# Patient Record
Sex: MALE | Race: WHITE | NOT HISPANIC OR LATINO | Employment: FULL TIME | URBAN - NONMETROPOLITAN AREA
[De-identification: names, ages, dates, MRNs, and addresses within clinical notes are randomized per-mention and may not be internally consistent; named-entity substitution may affect disease eponyms.]

---

## 2017-06-06 ENCOUNTER — TELEPHONE (OUTPATIENT)
Dept: URGENT CARE | Facility: CLINIC | Age: 62
End: 2017-06-06

## 2017-06-06 NOTE — TELEPHONE ENCOUNTER
----- Message from SHAE Benavidez sent at 6/6/2017  7:53 AM EDT -----  Please fax culture results to oral surgeon please thank you.

## 2018-04-20 ENCOUNTER — TRANSCRIBE ORDERS (OUTPATIENT)
Dept: ADMINISTRATIVE | Facility: HOSPITAL | Age: 63
End: 2018-04-20

## 2018-04-20 DIAGNOSIS — R22.1 NECK MASS: Primary | ICD-10-CM

## 2018-04-23 ENCOUNTER — HOSPITAL ENCOUNTER (OUTPATIENT)
Dept: CT IMAGING | Facility: HOSPITAL | Age: 63
Discharge: HOME | End: 2018-04-23
Admitting: ORAL & MAXILLOFACIAL SURGERY

## 2018-04-23 DIAGNOSIS — R22.1 NECK MASS: ICD-10-CM

## 2018-04-23 LAB
CREAT BLD-MCNC: 1 MG/DL (ref 0.6–1.3)
GFR SERPL CREATININE-BSD FRML MDRD: 75 ML/MIN/1.73

## 2018-04-23 PROCEDURE — 82565 ASSAY OF CREATININE: CPT | Performed by: RADIOLOGY

## 2018-04-23 PROCEDURE — 70491 CT SOFT TISSUE NECK W/DYE: CPT

## 2018-04-23 PROCEDURE — 25010000002 IOPAMIDOL 61 % SOLUTION: Performed by: ORAL & MAXILLOFACIAL SURGERY

## 2018-04-23 RX ADMIN — IOPAMIDOL 90 ML: 612 INJECTION, SOLUTION INTRAVENOUS at 17:30

## 2018-05-07 ENCOUNTER — TRANSCRIBE ORDERS (OUTPATIENT)
Dept: ADMINISTRATIVE | Facility: HOSPITAL | Age: 63
End: 2018-05-07

## 2018-05-07 DIAGNOSIS — R22.1 NECK MASS: Primary | ICD-10-CM

## 2018-05-11 ENCOUNTER — HOSPITAL ENCOUNTER (OUTPATIENT)
Dept: PET IMAGING | Facility: HOSPITAL | Age: 63
Discharge: HOME | End: 2018-05-11

## 2018-05-11 ENCOUNTER — HOSPITAL ENCOUNTER (OUTPATIENT)
Dept: PET IMAGING | Facility: HOSPITAL | Age: 63
Discharge: HOME | End: 2018-05-11
Admitting: ORAL & MAXILLOFACIAL SURGERY

## 2018-05-11 ENCOUNTER — HOSPITAL ENCOUNTER (OUTPATIENT)
Dept: CT IMAGING | Facility: HOSPITAL | Age: 63
Discharge: HOME | End: 2018-05-11

## 2018-05-11 DIAGNOSIS — R22.1 NECK MASS: ICD-10-CM

## 2018-05-11 LAB
CREAT BLDA-MCNC: 1 MG/DL (ref 0.6–1.3)
GLUCOSE BLDC GLUCOMTR-MCNC: 102 MG/DL (ref 70–130)

## 2018-05-11 PROCEDURE — A9552 F18 FDG: HCPCS | Performed by: ORAL & MAXILLOFACIAL SURGERY

## 2018-05-11 PROCEDURE — 25010000002 IOPAMIDOL 61 % SOLUTION: Performed by: ORAL & MAXILLOFACIAL SURGERY

## 2018-05-11 PROCEDURE — 82565 ASSAY OF CREATININE: CPT

## 2018-05-11 PROCEDURE — 78815 PET IMAGE W/CT SKULL-THIGH: CPT

## 2018-05-11 PROCEDURE — 71260 CT THORAX DX C+: CPT

## 2018-05-11 PROCEDURE — 82962 GLUCOSE BLOOD TEST: CPT

## 2018-05-11 PROCEDURE — 0 FLUDEOXYGLUCOSE F18 SOLUTION: Performed by: ORAL & MAXILLOFACIAL SURGERY

## 2018-05-11 RX ADMIN — IOPAMIDOL 60 ML: 612 INJECTION, SOLUTION INTRAVENOUS at 09:50

## 2018-05-11 RX ADMIN — FLUDEOXYGLUCOSE F18 1 DOSE: 300 INJECTION INTRAVENOUS at 08:14

## 2019-03-22 NOTE — TELEPHONE ENCOUNTER
Contacted [facility] for oral and Maxillofacial surgery  And sent fax of culture result. Fax # [phone]   LMTCB to let patient know refills were sent.      Refill request is for a maintenance medication and has met the criteria specified in the Ambulatory Medication Refill Standing Order for eligibility, visits, laboratory, alerts and was sent to the requested